# Patient Record
Sex: MALE | Race: ASIAN
[De-identification: names, ages, dates, MRNs, and addresses within clinical notes are randomized per-mention and may not be internally consistent; named-entity substitution may affect disease eponyms.]

---

## 2019-10-16 ENCOUNTER — HOSPITAL ENCOUNTER (EMERGENCY)
Dept: HOSPITAL 8 - ED | Age: 64
LOS: 9 days | Discharge: LEFT BEFORE DISCHARGE | End: 2019-10-25
Payer: COMMERCIAL

## 2019-10-16 VITALS — SYSTOLIC BLOOD PRESSURE: 136 MMHG | DIASTOLIC BLOOD PRESSURE: 62 MMHG

## 2019-10-16 VITALS — WEIGHT: 220.46 LBS | HEIGHT: 69 IN | BODY MASS INDEX: 32.65 KG/M2

## 2019-10-16 DIAGNOSIS — I25.2: ICD-10-CM

## 2019-10-16 DIAGNOSIS — I25.10: ICD-10-CM

## 2019-10-16 DIAGNOSIS — R07.89: Primary | ICD-10-CM

## 2019-10-16 DIAGNOSIS — I10: ICD-10-CM

## 2019-10-16 DIAGNOSIS — E78.00: ICD-10-CM

## 2019-10-16 LAB
ALBUMIN SERPL-MCNC: 3.5 G/DL (ref 3.4–5)
ANION GAP SERPL CALC-SCNC: 6 MMOL/L (ref 5–15)
BASOPHILS # BLD AUTO: 0.01 X10^3/UL (ref 0–0.1)
BASOPHILS NFR BLD AUTO: 0 % (ref 0–1)
CALCIUM SERPL-MCNC: 8.6 MG/DL (ref 8.5–10.1)
CHLORIDE SERPL-SCNC: 112 MMOL/L (ref 98–107)
CREAT SERPL-MCNC: 1.22 MG/DL (ref 0.7–1.3)
EOSINOPHIL # BLD AUTO: 0.19 X10^3/UL (ref 0–0.4)
EOSINOPHIL NFR BLD AUTO: 4 % (ref 1–7)
ERYTHROCYTE [DISTWIDTH] IN BLOOD BY AUTOMATED COUNT: 15.5 % (ref 9.4–14.8)
LYMPHOCYTES # BLD AUTO: 1.28 X10^3/UL (ref 1–3.4)
LYMPHOCYTES NFR BLD AUTO: 27 % (ref 22–44)
MCH RBC QN AUTO: 30.1 PG (ref 27.5–34.5)
MCHC RBC AUTO-ENTMCNC: 32.4 G/DL (ref 33.2–36.2)
MCV RBC AUTO: 92.9 FL (ref 81–97)
MD: NO
MONOCYTES # BLD AUTO: 0.57 X10^3/UL (ref 0.2–0.8)
MONOCYTES NFR BLD AUTO: 12 % (ref 2–9)
NEUTROPHILS # BLD AUTO: 2.63 X10^3/UL (ref 1.8–6.8)
NEUTROPHILS NFR BLD AUTO: 56 % (ref 42–75)
PLATELET # BLD AUTO: 302 X10^3/UL (ref 130–400)
PMV BLD AUTO: 7 FL (ref 7.4–10.4)
RBC # BLD AUTO: 4.9 X10^6/UL (ref 4.38–5.82)
TROPONIN I SERPL-MCNC: < 0.015 NG/ML (ref 0–0.04)

## 2019-10-16 PROCEDURE — 99284 EMERGENCY DEPT VISIT MOD MDM: CPT

## 2019-10-16 PROCEDURE — 82040 ASSAY OF SERUM ALBUMIN: CPT

## 2019-10-16 PROCEDURE — 93005 ELECTROCARDIOGRAM TRACING: CPT

## 2019-10-16 PROCEDURE — 84484 ASSAY OF TROPONIN QUANT: CPT

## 2019-10-16 PROCEDURE — 36415 COLL VENOUS BLD VENIPUNCTURE: CPT

## 2019-10-16 PROCEDURE — 71045 X-RAY EXAM CHEST 1 VIEW: CPT

## 2019-10-16 PROCEDURE — 80048 BASIC METABOLIC PNL TOTAL CA: CPT

## 2019-10-16 PROCEDURE — 99285 EMERGENCY DEPT VISIT HI MDM: CPT

## 2019-10-16 PROCEDURE — 85025 COMPLETE CBC W/AUTO DIFF WBC: CPT

## 2019-10-16 NOTE — NUR
RN WALKED BY ROOM AND PATIENT/PATIENT BELONGINGS GONE, ALL MONITORS ON FLOOR. 
RN ALERTED STAFF AND LOOKED FOR PATIENT, PT NOT IN ED. MD NOTIFIED.

## 2019-10-16 NOTE — NUR
PT TO ED WITH CHEST PAIN AND SOB - SUBSTERNAL CP, RADIATES TO BACK NO CHANGE 
WITH RESPIRATION. PAIN STARTED LAST NIGHT "IT COMES AND GOES"HX CABG 2011, 
STENTS X 5 2016, SENT X 1 4/2019. PT PLACED ON MONITOR. MD AT BEDSIDE FOR EVAL

## 2019-10-16 NOTE — NUR
PT GIVEN NITRO WITH SOME RELIEF, REQUESTED ADDITIONAL DOSE. ADDITIONAL DOSE 
ADMINISTERED PER MAR, /54

## 2020-05-01 ENCOUNTER — HOSPITAL ENCOUNTER (OUTPATIENT)
Dept: HOSPITAL 8 - CFH | Age: 65
Discharge: HOME | End: 2020-05-01
Attending: INTERNAL MEDICINE
Payer: MEDICARE

## 2020-05-01 DIAGNOSIS — R07.9: ICD-10-CM

## 2020-05-01 DIAGNOSIS — R93.1: Primary | ICD-10-CM

## 2020-05-01 DIAGNOSIS — R06.02: ICD-10-CM

## 2020-05-01 PROCEDURE — 93017 CV STRESS TEST TRACING ONLY: CPT

## 2020-05-01 PROCEDURE — A9502 TC99M TETROFOSMIN: HCPCS

## 2020-05-01 PROCEDURE — 78452 HT MUSCLE IMAGE SPECT MULT: CPT

## 2021-07-13 ENCOUNTER — HOSPITAL ENCOUNTER (OUTPATIENT)
Dept: HOSPITAL 8 - CVU | Age: 66
Discharge: HOME | End: 2021-07-13
Attending: INTERNAL MEDICINE
Payer: MEDICARE

## 2021-07-13 DIAGNOSIS — I25.810: ICD-10-CM

## 2021-07-13 DIAGNOSIS — Z95.1: ICD-10-CM

## 2021-07-13 DIAGNOSIS — E11.9: ICD-10-CM

## 2021-07-13 DIAGNOSIS — E78.5: ICD-10-CM

## 2021-07-13 DIAGNOSIS — I08.2: Primary | ICD-10-CM

## 2021-07-13 DIAGNOSIS — I11.9: ICD-10-CM

## 2021-07-13 PROCEDURE — 93356 MYOCRD STRAIN IMG SPCKL TRCK: CPT

## 2021-07-13 PROCEDURE — 93306 TTE W/DOPPLER COMPLETE: CPT

## 2021-09-02 ENCOUNTER — HOSPITAL ENCOUNTER (OUTPATIENT)
Dept: HOSPITAL 8 - RAD | Age: 66
Discharge: HOME | End: 2021-09-02
Attending: INTERNAL MEDICINE
Payer: MEDICARE

## 2021-09-02 DIAGNOSIS — I77.810: Primary | ICD-10-CM

## 2021-09-02 PROCEDURE — 71275 CT ANGIOGRAPHY CHEST: CPT
